# Patient Record
Sex: MALE | Race: WHITE | NOT HISPANIC OR LATINO | Employment: FULL TIME | URBAN - METROPOLITAN AREA
[De-identification: names, ages, dates, MRNs, and addresses within clinical notes are randomized per-mention and may not be internally consistent; named-entity substitution may affect disease eponyms.]

---

## 2022-07-11 PROBLEM — Z86.0100 HISTORY OF COLON POLYPS: Status: ACTIVE | Noted: 2022-07-11

## 2022-12-30 PROBLEM — E66.811 CLASS 1 OBESITY DUE TO EXCESS CALORIES WITH SERIOUS COMORBIDITY AND BODY MASS INDEX (BMI) OF 34.0 TO 34.9 IN ADULT: Status: ACTIVE | Noted: 2018-11-14

## 2024-03-05 ENCOUNTER — TELEPHONE (OUTPATIENT)
Dept: FAMILY MEDICINE CLINIC | Facility: CLINIC | Age: 69
End: 2024-03-05

## 2024-03-05 ENCOUNTER — OFFICE VISIT (OUTPATIENT)
Dept: FAMILY MEDICINE CLINIC | Facility: CLINIC | Age: 69
End: 2024-03-05
Payer: MEDICARE

## 2024-03-05 VITALS
OXYGEN SATURATION: 98 % | SYSTOLIC BLOOD PRESSURE: 154 MMHG | TEMPERATURE: 97.8 F | HEART RATE: 61 BPM | WEIGHT: 249.8 LBS | HEIGHT: 73 IN | DIASTOLIC BLOOD PRESSURE: 96 MMHG | BODY MASS INDEX: 33.11 KG/M2 | RESPIRATION RATE: 18 BRPM

## 2024-03-05 DIAGNOSIS — E11.65 TYPE 2 DIABETES MELLITUS WITH HYPERGLYCEMIA, WITHOUT LONG-TERM CURRENT USE OF INSULIN: ICD-10-CM

## 2024-03-05 DIAGNOSIS — M25.511 CHRONIC RIGHT SHOULDER PAIN: ICD-10-CM

## 2024-03-05 DIAGNOSIS — G89.29 CHRONIC RIGHT SHOULDER PAIN: ICD-10-CM

## 2024-03-05 DIAGNOSIS — I10 PRIMARY HYPERTENSION: ICD-10-CM

## 2024-03-05 DIAGNOSIS — E11.65 TYPE 2 DIABETES MELLITUS WITH HYPERGLYCEMIA, WITHOUT LONG-TERM CURRENT USE OF INSULIN: Primary | ICD-10-CM

## 2024-03-05 LAB
EXPIRATION DATE: ABNORMAL
HBA1C MFR BLD: 10.2 % (ref 4.5–5.7)
Lab: ABNORMAL

## 2024-03-05 RX ORDER — MELOXICAM 7.5 MG/1
7.5 TABLET ORAL DAILY
COMMUNITY

## 2024-03-05 RX ORDER — OLMESARTAN MEDOXOMIL 5 MG/1
5 TABLET ORAL DAILY
Qty: 30 TABLET | Refills: 2 | Status: SHIPPED | OUTPATIENT
Start: 2024-03-05

## 2024-03-05 NOTE — PROGRESS NOTES
"Subjective   Wally Howard Jr. is a 68 y.o. male.     History of Present Illness   Pt presents for follow up   Last office visit was in 2022   Pt has hx of uncontrolled DM that he tries to regulate with diet, weight management and lifestyle   Saw Dr. Blunt 12/22 and was started back on medication with metformin and Mounjaro. No follow up since that time. Took metformin for awhile and notes he tolerated fine. Mounjaro was around 400 dollars a month. Notes his current insurance plan does not have prescription coverage. Plans to change this on next open enrollment   Has been seeing weight loss clinic and prescribed semaglutide. Has taken for about 6 weeks   Had labs prior to starting weight loss medication and A1c was over 11%. Pt notes he has finally told his wife about his diabetes and knows it is time to get things under better control.     Vit D, calcium and magnesium supplement   Also takes CoQ10    R shoulder pain since fall at work about 5 years ago   No follow up after injury   Pain worse at night when sleeping   Wanting to see ortho and PT     BP has consistently been elevated in office. Has been treated with benicar in the past for HTN   Denies any cardiac symptoms     The following portions of the patient's history were reviewed and updated as appropriate: allergies, current medications, past family history, past medical history, past social history, past surgical history, and problem list.    Review of Systems  As noted per HPI     Objective   Blood pressure 154/96, pulse 61, temperature 97.8 °F (36.6 °C), resp. rate 18, height 185.4 cm (73\"), weight 113 kg (249 lb 12.8 oz), SpO2 98%.     Physical Exam  Vitals and nursing note reviewed.   Constitutional:       Appearance: Normal appearance.   Cardiovascular:      Rate and Rhythm: Normal rate and regular rhythm.   Pulmonary:      Effort: Pulmonary effort is normal.      Breath sounds: Normal breath sounds.   Musculoskeletal:      Right shoulder: " Tenderness present. Normal range of motion. Normal strength.   Skin:     General: Skin is warm and dry.   Neurological:      Mental Status: He is alert and oriented to person, place, and time.   Psychiatric:         Mood and Affect: Mood normal.         Behavior: Behavior normal.         Assessment & Plan   Diagnoses and all orders for this visit:    1. Type 2 diabetes mellitus with hyperglycemia, without long-term current use of insulin (Primary)  -     POC Glycosylated Hemoglobin (Hb A1C)  -     metFORMIN (Glucophage) 500 MG tablet; Take 1 tablet by mouth 2 (Two) Times a Day With Meals.  Dispense: 60 tablet; Refill: 2  -     Tirzepatide (MOUNJARO) 5 MG/0.5ML solution pen-injector pen; Inject 0.5 mL under the skin into the appropriate area as directed 1 (One) Time Per Week.  Dispense: 2 mL; Refill: 2    2. Primary hypertension  -     olmesartan (Benicar) 5 MG tablet; Take 1 tablet by mouth Daily.  Dispense: 30 tablet; Refill: 2    3. Chronic right shoulder pain  -     Ambulatory Referral to Orthopedic Surgery  -     Ambulatory Referral to Physical Therapy Evaluate and treat      A1c uncontrolled at 10.2% , but has improved since starting injections at weight loss clinic. Will compare price of Mounjaro prescription at pharmacy vs continued injections at weight loss clinic. Start back on metformin and benicar as directed.   Pt will follow up in 1 month for MWV and fasting labs   Referral to ortho and PT for shoulder issues

## 2024-03-05 NOTE — TELEPHONE ENCOUNTER
Provider: KATHRYN BOYCE    Caller: JEFF NATHAN    Relationship to Patient: SELF    Pharmacy: [facility]    Phone Number: [phone]     Reason for Call: THE PATIENT STATED THAT HIS PHARMACY HAS METFORMIN BUT NO THE MOUNJARO.

## 2024-03-06 NOTE — TELEPHONE ENCOUNTER
Confirm with pharmacy if they did not receive Mounjaro Rx, they do not have it in stock or if patient not able to  due to PA needed

## 2024-03-06 NOTE — TELEPHONE ENCOUNTER
[facility] stated that they are out of stock and can't run it thorugh due to being out of stock so no idea if PA will be needed

## 2024-03-07 DIAGNOSIS — E11.65 TYPE 2 DIABETES MELLITUS WITH HYPERGLYCEMIA, WITHOUT LONG-TERM CURRENT USE OF INSULIN: ICD-10-CM

## 2024-03-07 NOTE — TELEPHONE ENCOUNTER
"  Caller: Wally Howard Jr \"Lewis\"    Relationship: Self    Best call back number: [phone]     Requested Prescriptions:   Requested Prescriptions     Pending Prescriptions Disp Refills    Tirzepatide (MOUNJARO) 5 MG/0.5ML solution pen-injector pen 2 mL 2     Sig: Inject 0.5 mL under the skin into the appropriate area as directed 1 (One) Time Per Week.        Pharmacy where request should be sent: [facility] [address] [place] [address]. [place] - [phone]  - [phone] FX     Last office visit with prescribing clinician: 3/5/2024   Last telemedicine visit with prescribing clinician: Visit date not found   Next office visit with prescribing clinician: 4/2/2024     Additional details provided by patient: [facility] OUT OF MEDICATION, CANNOT GET.  PLEASE RESEND TO [facility].  PLEASE CALL PATIENT BACK.      Does the patient have less than a 3 day supply:  [x] Yes  [] No    Would you like a call back once the refill request has been completed: [x] Yes [] No    If the office needs to give you a call back, can they leave a voicemail: [x] Yes [] No    Rosy Langston   03/07/24 09:37 EST     "

## 2024-03-07 NOTE — TELEPHONE ENCOUNTER
Okay, did the pharmacy say when they expect to have next shipment? If this is only a few day concern, not worth sending the Rx to a different pharmacy

## 2024-03-08 NOTE — TELEPHONE ENCOUNTER
Pharmacy doesn't have any idea when they will receive another shipment since it's nationwide backorder

## 2024-03-11 NOTE — TELEPHONE ENCOUNTER
Pt stated he had this taken care of and insurance covered most of the cost-nothing else to do on this message

## 2024-03-11 NOTE — TELEPHONE ENCOUNTER
Okay please let patient know I resent to [facility] as requested . Can see if ozempic is in stock if he would like if they do not have, but encourage checking with the pharmacy first to see if they have

## 2024-03-14 ENCOUNTER — PATIENT OUTREACH (OUTPATIENT)
Dept: CASE MANAGEMENT | Facility: OTHER | Age: 69
End: 2024-03-14
Payer: MEDICARE

## 2024-03-14 NOTE — OUTREACH NOTE
"AMBULATORY CASE MANAGEMENT NOTE    Name and Relationship of Patient/Support Person: Lee Cook, Wally GARCIA \"Lewis\" - Self    Patient Outreach    Proactive outreach to [facility] pt identified for ACM services and support. Briefly discussed HRCM program and pt politely declined at this time. He states he seems to be managing things better at this time. He will call ACM if anything changes.     Krystle MALDONADO  Ambulatory Case Management    3/14/2024, 15:58 EDT  "

## 2024-03-20 ENCOUNTER — TELEPHONE (OUTPATIENT)
Dept: FAMILY MEDICINE CLINIC | Facility: CLINIC | Age: 69
End: 2024-03-20
Payer: MEDICARE

## 2024-03-20 DIAGNOSIS — I10 PRIMARY HYPERTENSION: Primary | ICD-10-CM

## 2024-03-20 RX ORDER — OLMESARTAN MEDOXOMIL 20 MG/1
20 TABLET ORAL DAILY
Qty: 30 TABLET | Refills: 0 | Status: SHIPPED | OUTPATIENT
Start: 2024-03-20

## 2024-03-20 NOTE — TELEPHONE ENCOUNTER
See message. I sent in the 20 mg for next fill since no 10 mg available. Double up on his 5 mg for now

## 2024-03-20 NOTE — TELEPHONE ENCOUNTER
"Caller: Wally Howard Jr \"Lewis\"    Relationship: Self    Best call back number: [phone]     Which medication are you concerned about: OLMESARTAN (BENICAR) 5MG     Who prescribed you this medication: KATHRYN BOYCE    When did you start taking this medication: ABOUT 2 WEEKS OR SO AGO    What are your concerns: PATIENT STATED THAT IT HAS BEEN ABOUT 2.5 WEEKS AND HIS BLOOD PRESSURE IS STILL HOVERING AROUND -[address]    TODAY HIS BLOOD PRESSURE /106    SHOULD ANY ADJUSTMENTS BE MADE?    PLEASE ADVISE PATIENT  "

## 2024-04-20 DIAGNOSIS — I10 PRIMARY HYPERTENSION: ICD-10-CM

## 2024-04-22 RX ORDER — OLMESARTAN MEDOXOMIL 20 MG/1
20 TABLET ORAL DAILY
Qty: 90 TABLET | Refills: 0 | Status: SHIPPED | OUTPATIENT
Start: 2024-04-22

## 2024-06-02 DIAGNOSIS — E11.65 TYPE 2 DIABETES MELLITUS WITH HYPERGLYCEMIA, WITHOUT LONG-TERM CURRENT USE OF INSULIN: ICD-10-CM

## 2024-06-06 DIAGNOSIS — I10 PRIMARY HYPERTENSION: ICD-10-CM

## 2024-06-06 RX ORDER — OLMESARTAN MEDOXOMIL 5 MG/1
5 TABLET ORAL DAILY
Qty: 30 TABLET | Refills: 2 | OUTPATIENT
Start: 2024-06-06

## 2024-06-27 ENCOUNTER — OFFICE VISIT (OUTPATIENT)
Dept: FAMILY MEDICINE CLINIC | Facility: CLINIC | Age: 69
End: 2024-06-27
Payer: MEDICARE

## 2024-06-27 VITALS
WEIGHT: 226 LBS | DIASTOLIC BLOOD PRESSURE: 70 MMHG | TEMPERATURE: 97.7 F | SYSTOLIC BLOOD PRESSURE: 136 MMHG | HEIGHT: 73 IN | OXYGEN SATURATION: 97 % | HEART RATE: 71 BPM | BODY MASS INDEX: 29.95 KG/M2

## 2024-06-27 DIAGNOSIS — E11.65 TYPE 2 DIABETES MELLITUS WITH HYPERGLYCEMIA, WITHOUT LONG-TERM CURRENT USE OF INSULIN: ICD-10-CM

## 2024-06-27 DIAGNOSIS — I10 PRIMARY HYPERTENSION: ICD-10-CM

## 2024-06-27 DIAGNOSIS — Z12.5 SCREENING FOR MALIGNANT NEOPLASM OF PROSTATE: ICD-10-CM

## 2024-06-27 DIAGNOSIS — E55.9 VITAMIN D INSUFFICIENCY: ICD-10-CM

## 2024-06-27 DIAGNOSIS — Z00.00 MEDICARE ANNUAL WELLNESS VISIT, SUBSEQUENT: Primary | ICD-10-CM

## 2024-06-27 DIAGNOSIS — E11.9 TYPE 2 DIABETES MELLITUS WITHOUT COMPLICATION, WITHOUT LONG-TERM CURRENT USE OF INSULIN: ICD-10-CM

## 2024-06-27 DIAGNOSIS — Z51.81 ENCOUNTER FOR MEDICATION MONITORING: ICD-10-CM

## 2024-06-27 LAB
EXPIRATION DATE: NORMAL
EXPIRATION DATE: NORMAL
HBA1C MFR BLD: 5.6 % (ref 4.5–5.7)
Lab: NORMAL
Lab: NORMAL
POC CREATININE URINE: 100
POC MICROALBUMIN URINE: 10

## 2024-06-27 PROCEDURE — 3078F DIAST BP <80 MM HG: CPT | Performed by: PHYSICIAN ASSISTANT

## 2024-06-27 PROCEDURE — G0439 PPPS, SUBSEQ VISIT: HCPCS | Performed by: PHYSICIAN ASSISTANT

## 2024-06-27 PROCEDURE — 3044F HG A1C LEVEL LT 7.0%: CPT | Performed by: PHYSICIAN ASSISTANT

## 2024-06-27 PROCEDURE — 3075F SYST BP GE 130 - 139MM HG: CPT | Performed by: PHYSICIAN ASSISTANT

## 2024-06-27 PROCEDURE — 83036 HEMOGLOBIN GLYCOSYLATED A1C: CPT | Performed by: PHYSICIAN ASSISTANT

## 2024-06-27 PROCEDURE — 1126F AMNT PAIN NOTED NONE PRSNT: CPT | Performed by: PHYSICIAN ASSISTANT

## 2024-06-27 PROCEDURE — 82044 UR ALBUMIN SEMIQUANTITATIVE: CPT | Performed by: PHYSICIAN ASSISTANT

## 2024-06-27 RX ORDER — OLMESARTAN MEDOXOMIL 20 MG/1
20 TABLET ORAL DAILY
Qty: 90 TABLET | Refills: 3 | Status: SHIPPED | OUTPATIENT
Start: 2024-06-27

## 2024-06-27 NOTE — PROGRESS NOTES
The ABCs of the Annual Wellness Visit  Subsequent Medicare Wellness Visit    Subjective    Wally Howard Jr is a 69 y.o. male who presents for a Subsequent Medicare Wellness Visit.    F/u for DM   Has been able to get Mounjaro with insurance. Tolerating well. Some constipation. Down about 20 lbs   Increased protein. Decreased sweets and carbs. Eating keto friendly foods   Blood sugar has been in low 100s upper 90s     Exercising regularly     BP has been well controlled with bump up of benicar. Some elevated readings prior to sitting and resting for several minutes       The following portions of the patient's history were reviewed and   updated as appropriate: allergies, current medications, past family history, past medical history, past social history, past surgical history, and problem list.    Compared to one year ago, the patient feels his physical   health is better.    Compared to one year ago, the patient feels his mental   health is better.    Recent Hospitalizations:  He was not admitted to the hospital during the last year.       Current Medical Providers:  Patient Care Team:  Nell Da Silva PA as PCP - General (Physician Assistant)    Outpatient Medications Prior to Visit   Medication Sig Dispense Refill    loratadine (Claritin) 10 MG tablet Take 1 tablet by mouth Daily.      meloxicam (MOBIC) 7.5 MG tablet Take 1 tablet by mouth Daily.      metFORMIN (GLUCOPHAGE) 500 MG tablet TAKE 1 TABLET BY MOUTH TWICE DAILY WITH MEALS 60 tablet 2    olmesartan (BENICAR) 20 MG tablet TAKE 1 TABLET BY MOUTH DAILY 90 tablet 0    Tirzepatide (MOUNJARO) 5 MG/0.5ML solution pen-injector pen Inject 0.5 mL under the skin into the appropriate area as directed 1 (One) Time Per Week. 2 mL 2     No facility-administered medications prior to visit.       No opioid medication identified on active medication list. I have reviewed chart for other potential  high risk medication/s and harmful drug interactions in the  "elderly.        Aspirin is not on active medication list.  Aspirin use is indicated based on review of current medical condition/s. Pros and cons of this therapy have been discussed with this patient. Benefits of this medication outweigh potential harm.  Patient has been instructed to start taking this medication..    Patient Active Problem List   Diagnosis    Kidney stones    Squamous cell carcinoma of skin    Seasonal allergies    Hypogonadism male    Hypertension    Mixed hyperlipidemia    Erectile dysfunction    Class 1 obesity due to excess calories with serious comorbidity and body mass index (BMI) of 34.0 to 34.9 in adult    Type 2 diabetes mellitus without complication, without long-term current use of insulin    History of colon polyps     Advance Care Planning   Advance Care Planning     Advance Directive is not on file.  ACP discussion was held with the patient during this visit. Patient does not have an advance directive, information provided.     Objective    Vitals:    24 1517   BP: 136/70   Pulse: 71   Temp: 97.7 °F (36.5 °C)   SpO2: 97%   Weight: 103 kg (226 lb)   Height: 185.4 cm (73\")   PainSc: 0-No pain     Estimated body mass index is 29.82 kg/m² as calculated from the following:    Height as of this encounter: 185.4 cm (73\").    Weight as of this encounter: 103 kg (226 lb).           Does the patient have evidence of cognitive impairment?   No    Lab Results   Component Value Date    HGBA1C 5.6 2024          HEALTH RISK ASSESSMENT    Smoking Status:  Social History     Tobacco Use   Smoking Status Never    Passive exposure: Never   Smokeless Tobacco Never     Alcohol Consumption:  Social History     Substance and Sexual Activity   Alcohol Use Not Currently    Comment: Occasional glass of wine     Fall Risk Screen:    LUZ MARIA Fall Risk Assessment was completed, and patient is at LOW risk for falls.Assessment completed on:2024    Depression Screenin/27/2024     3:24 PM "   PHQ-2/PHQ-9 Depression Screening   Little Interest or Pleasure in Doing Things 0-->not at all   Feeling Down, Depressed or Hopeless 0-->not at all   PHQ-9: Brief Depression Severity Measure Score 0       Health Habits and Functional and Cognitive Screenin/27/2024     3:23 PM   Functional & Cognitive Status   Do you have difficulty preparing food and eating? No   Do you have difficulty bathing yourself, getting dressed or grooming yourself? No   Do you have difficulty using the toilet? No   Do you have difficulty moving around from place to place? No   Do you have trouble with steps or getting out of a bed or a chair? No   Current Diet Low Fat Diet   Dental Exam Up to date   Eye Exam Up to date   Exercise (times per week) 3 times per week   Current Exercises Include Walking;Treadmill;Light Weights   Do you need help using the phone?  No   Are you deaf or do you have serious difficulty hearing?  No   Do you need help to go to places out of walking distance? No   Do you need help shopping? No   Do you need help preparing meals?  No   Do you need help with housework?  No   Do you need help with laundry? No   Do you need help taking your medications? No   Do you need help managing money? No   Do you ever drive or ride in a car without wearing a seat belt? No   Have you felt unusual stress, anger or loneliness in the last month? No   Who do you live with? Spouse   If you need help, do you have trouble finding someone available to you? No   Have you been bothered in the last four weeks by sexual problems? No   Do you have difficulty concentrating, remembering or making decisions? No       Age-appropriate Screening Schedule:  Refer to the list below for future screening recommendations based on patient's age, sex and/or medical conditions. Orders for these recommended tests are listed in the plan section. The patient has been provided with a written plan.    Health Maintenance   Topic Date Due    RSV Vaccine -  Adults (1 - 1-dose 60+ series) Never done    DIABETIC FOOT EXAM  Never done    DIABETIC EYE EXAM  04/01/2023    LIPID PANEL  09/14/2023    COVID-19 Vaccine (7 - 2023-24 season) 09/16/2024 (Originally 2/9/2024)    INFLUENZA VACCINE  08/01/2024    HEMOGLOBIN A1C  12/27/2024    BMI FOLLOWUP  03/05/2025    ANNUAL WELLNESS VISIT  06/27/2025    URINE MICROALBUMIN  06/27/2025    TDAP/TD VACCINES (2 - Td or Tdap) 09/20/2026    COLORECTAL CANCER SCREENING  07/07/2027    Pneumococcal Vaccine 65+  Completed    HEPATITIS C SCREENING  Addressed    ZOSTER VACCINE  Addressed                  CMS Preventative Services Quick Reference  Risk Factors Identified During Encounter:    None Identified    The above risks/problems have been discussed with the patient.  Pertinent information has been shared with the patient in the After Visit Summary.    Diagnoses and all orders for this visit:    1. Medicare annual wellness visit, subsequent (Primary)  -     POCT microalbumin  -     CBC w AUTO Differential  -     Comprehensive metabolic panel  -     TSH  -     T4, free  -     Vitamin D 25 hydroxy  -     PSA SCREENING  -     Magnesium  -     POC Glycosylated Hemoglobin (Hb A1C)  -     Lipid Panel    2. Type 2 diabetes mellitus without complication, without long-term current use of insulin  -     POCT microalbumin  -     CBC w AUTO Differential  -     Comprehensive metabolic panel  -     TSH  -     T4, free  -     POC Glycosylated Hemoglobin (Hb A1C)  -     Lipid Panel    3. Primary hypertension  -     CBC w AUTO Differential  -     Comprehensive metabolic panel    4. Encounter for medication monitoring  -     Magnesium    5. Screening for malignant neoplasm of prostate  -     PSA SCREENING    6. Vitamin D insufficiency  -     Vitamin D 25 hydroxy        Follow Up:   Next Medicare Wellness visit to be scheduled in 1 year.      An After Visit Summary and PPPS were made available to the patient.    Contracture of left hand

## 2024-06-28 LAB
25(OH)D3+25(OH)D2 SERPL-MCNC: 53.2 NG/ML (ref 30–100)
ALBUMIN SERPL-MCNC: 4.4 G/DL (ref 3.9–4.9)
ALP SERPL-CCNC: 96 IU/L (ref 44–121)
ALT SERPL-CCNC: 11 IU/L (ref 0–44)
AST SERPL-CCNC: 15 IU/L (ref 0–40)
BASOPHILS # BLD AUTO: 0.1 X10E3/UL (ref 0–0.2)
BASOPHILS NFR BLD AUTO: 1 %
BILIRUB SERPL-MCNC: 0.7 MG/DL (ref 0–1.2)
BUN SERPL-MCNC: 24 MG/DL (ref 8–27)
BUN/CREAT SERPL: 22 (ref 10–24)
CALCIUM SERPL-MCNC: 9.9 MG/DL (ref 8.6–10.2)
CHLORIDE SERPL-SCNC: 99 MMOL/L (ref 96–106)
CHOLEST SERPL-MCNC: 170 MG/DL (ref 100–199)
CO2 SERPL-SCNC: 22 MMOL/L (ref 20–29)
CREAT SERPL-MCNC: 1.08 MG/DL (ref 0.76–1.27)
EGFRCR SERPLBLD CKD-EPI 2021: 74 ML/MIN/1.73
EOSINOPHIL # BLD AUTO: 0.1 X10E3/UL (ref 0–0.4)
EOSINOPHIL NFR BLD AUTO: 1 %
ERYTHROCYTE [DISTWIDTH] IN BLOOD BY AUTOMATED COUNT: 12.6 % (ref 11.6–15.4)
GLOBULIN SER CALC-MCNC: 2.8 G/DL (ref 1.5–4.5)
GLUCOSE SERPL-MCNC: 82 MG/DL (ref 70–99)
HCT VFR BLD AUTO: 48 % (ref 37.5–51)
HDLC SERPL-MCNC: 42 MG/DL
HGB BLD-MCNC: 15.9 G/DL (ref 13–17.7)
IMM GRANULOCYTES # BLD AUTO: 0 X10E3/UL (ref 0–0.1)
IMM GRANULOCYTES NFR BLD AUTO: 0 %
LDLC SERPL CALC-MCNC: 112 MG/DL (ref 0–99)
LYMPHOCYTES # BLD AUTO: 3.1 X10E3/UL (ref 0.7–3.1)
LYMPHOCYTES NFR BLD AUTO: 33 %
MAGNESIUM SERPL-MCNC: 1.7 MG/DL (ref 1.6–2.3)
MCH RBC QN AUTO: 29.6 PG (ref 26.6–33)
MCHC RBC AUTO-ENTMCNC: 33.1 G/DL (ref 31.5–35.7)
MCV RBC AUTO: 89 FL (ref 79–97)
MONOCYTES # BLD AUTO: 0.6 X10E3/UL (ref 0.1–0.9)
MONOCYTES NFR BLD AUTO: 6 %
NEUTROPHILS # BLD AUTO: 5.4 X10E3/UL (ref 1.4–7)
NEUTROPHILS NFR BLD AUTO: 59 %
PLATELET # BLD AUTO: 253 X10E3/UL (ref 150–450)
POTASSIUM SERPL-SCNC: 4.7 MMOL/L (ref 3.5–5.2)
PROT SERPL-MCNC: 7.2 G/DL (ref 6–8.5)
PSA SERPL-MCNC: 0.6 NG/ML (ref 0–4)
RBC # BLD AUTO: 5.38 X10E6/UL (ref 4.14–5.8)
SODIUM SERPL-SCNC: 136 MMOL/L (ref 134–144)
T4 FREE SERPL-MCNC: 1.14 NG/DL (ref 0.82–1.77)
TRIGL SERPL-MCNC: 88 MG/DL (ref 0–149)
TSH SERPL DL<=0.005 MIU/L-ACNC: 4.54 UIU/ML (ref 0.45–4.5)
VLDLC SERPL CALC-MCNC: 16 MG/DL (ref 5–40)
WBC # BLD AUTO: 9.3 X10E3/UL (ref 3.4–10.8)

## 2024-07-11 ENCOUNTER — TELEPHONE (OUTPATIENT)
Dept: FAMILY MEDICINE CLINIC | Facility: CLINIC | Age: 69
End: 2024-07-11
Payer: MEDICARE

## 2024-07-11 DIAGNOSIS — E03.9 ACQUIRED HYPOTHYROIDISM: Primary | ICD-10-CM

## 2024-07-11 RX ORDER — LEVOTHYROXINE SODIUM 0.03 MG/1
25 TABLET ORAL
Qty: 30 TABLET | Refills: 1 | Status: SHIPPED | OUTPATIENT
Start: 2024-07-11

## 2024-07-11 NOTE — TELEPHONE ENCOUNTER
"  Caller: Wally Howard Jr \"Lewis\"    Relationship: Self    Best call back number: [phone]     What is the best time to reach you: ANY    Who are you requesting to speak with (clinical staff, provider,  specific staff member): KATHRYN BOYCE OR HER NURSE    What was the call regarding: THE PATIENT WAS SPEAKING WITH SOMEONE THAT SAID THEY WOULD TALK TO KATHRYN ABOUT HIS ISSUE WITH HIS THYROID TO SEE WHAT FURTHER STEPS WOULD BE RECOMMENDED. THEY ALSO WERE GOING TO SEE ABOUT A CARDIAC TEST. CAN HE PLEASE GET A CALL BACK ON THIS TO LET HIM KNOW?     Is it okay if the provider responds through MyChart: NO  "

## 2024-07-23 DIAGNOSIS — Z13.6 ENCOUNTER FOR SCREENING FOR CORONARY ARTERY DISEASE: Primary | ICD-10-CM

## 2024-08-06 ENCOUNTER — TELEPHONE (OUTPATIENT)
Dept: FAMILY MEDICINE CLINIC | Facility: CLINIC | Age: 69
End: 2024-08-06
Payer: MEDICARE

## 2024-08-06 DIAGNOSIS — E11.9 TYPE 2 DIABETES MELLITUS WITHOUT COMPLICATION, WITHOUT LONG-TERM CURRENT USE OF INSULIN: ICD-10-CM

## 2024-08-06 NOTE — TELEPHONE ENCOUNTER
"  Caller: Wally Howard Jr \"Lewis\"    Relationship: Self    Best call back number: [phone]    What is the best time to reach you: ANYTIME    Who are you requesting to speak with (clinical staff, provider,  specific staff member): CLINICAL STAFF    Do you know the name of the person who called: PATIENT    What was the call regarding: REQUESTING AN INCREASE IN Tirzepatide (MOUNJARO) 5 MG/0.5ML solution pen-injector pen TO 7.5    Is it okay if the provider responds through MyChart:     [facility] [address] [place] [address] RD. [place] - [phone]  - [phone] FX   "

## 2024-08-26 ENCOUNTER — TELEPHONE (OUTPATIENT)
Dept: FAMILY MEDICINE CLINIC | Facility: CLINIC | Age: 69
End: 2024-08-26
Payer: MEDICARE

## 2024-08-26 NOTE — TELEPHONE ENCOUNTER
"  Caller: Wally Howard Jr \"Lewis\"    Relationship: Self    Best call back number: [phone]     What is the best time to reach you: ANY    Who are you requesting to speak with (clinical staff, provider,  specific staff member): KATHRYN BOYCE OR HER NURSE    What was the call regarding: THE PATIENT IS GOING TO COME IN FOR HIS ACTIVE LAB ORDERS. HE WAS WANTING TO KNOW IF THERE IS ANY WAY THAT HE CAN GET A COVID TEST WITH THAT TOO. HE CAME BACK FROM VACATION AND HAS A CONGESTION, DRAINAGE, AND A TICKLE COUGH. HE SAID HE TOOK A HOME TEST AND IT WAS NEGATIVE BUT WANTS TO DOUBLE CHECK. IS THIS SOMETHING THAT CAN BE DONE OR WOULD HE NEED AN APPOINTMENT? PLEASE CALL TO LET HIM KNOW.     Is it okay if the provider responds through MyChart: NO  "

## 2024-09-12 ENCOUNTER — HOSPITAL ENCOUNTER (OUTPATIENT)
Facility: HOSPITAL | Age: 69
Discharge: HOME | End: 2024-09-12
Admitting: PHYSICIAN ASSISTANT

## 2024-09-12 DIAGNOSIS — Z13.6 ENCOUNTER FOR SCREENING FOR CORONARY ARTERY DISEASE: ICD-10-CM

## 2024-09-12 PROCEDURE — 75571 CT HRT W/O DYE W/CA TEST: CPT

## 2024-09-13 ENCOUNTER — LAB (OUTPATIENT)
Dept: FAMILY MEDICINE CLINIC | Facility: CLINIC | Age: 69
End: 2024-09-13
Payer: MEDICARE

## 2024-09-13 DIAGNOSIS — E03.9 ACQUIRED HYPOTHYROIDISM: ICD-10-CM

## 2024-09-15 DIAGNOSIS — E03.9 ACQUIRED HYPOTHYROIDISM: ICD-10-CM

## 2024-09-15 RX ORDER — LEVOTHYROXINE SODIUM 25 UG/1
25 TABLET ORAL
Qty: 90 TABLET | Refills: 1 | Status: SHIPPED | OUTPATIENT
Start: 2024-09-15

## 2024-09-15 RX ORDER — LEVOTHYROXINE SODIUM 25 UG/1
25 TABLET ORAL
Qty: 30 TABLET | Refills: 1 | OUTPATIENT
Start: 2024-09-15

## 2024-09-16 DIAGNOSIS — E78.2 MIXED HYPERLIPIDEMIA: Primary | ICD-10-CM

## 2024-09-16 RX ORDER — EZETIMIBE 10 MG/1
10 TABLET ORAL DAILY
Qty: 90 TABLET | Refills: 1 | Status: SHIPPED | OUTPATIENT
Start: 2024-09-16

## 2025-02-17 ENCOUNTER — TELEPHONE (OUTPATIENT)
Dept: FAMILY MEDICINE CLINIC | Facility: CLINIC | Age: 70
End: 2025-02-17
Payer: MEDICARE

## 2025-02-17 NOTE — TELEPHONE ENCOUNTER
PA for mounjaro  Key: [id number]  Approved today by [employer] NCPDP 2017  Your request has been approved  Authorization Expiration Date: 2/17/2026  PT informed

## 2025-03-11 DIAGNOSIS — E03.9 ACQUIRED HYPOTHYROIDISM: ICD-10-CM

## 2025-03-12 RX ORDER — LEVOTHYROXINE SODIUM 25 UG/1
25 TABLET ORAL
Qty: 90 TABLET | Refills: 0 | Status: SHIPPED | OUTPATIENT
Start: 2025-03-12

## 2025-03-20 DIAGNOSIS — E78.2 MIXED HYPERLIPIDEMIA: ICD-10-CM

## 2025-03-20 RX ORDER — EZETIMIBE 10 MG/1
10 TABLET ORAL DAILY
Qty: 90 TABLET | Refills: 1 | Status: SHIPPED | OUTPATIENT
Start: 2025-03-20

## 2025-05-21 ENCOUNTER — OFFICE VISIT (OUTPATIENT)
Dept: FAMILY MEDICINE CLINIC | Facility: CLINIC | Age: 70
End: 2025-05-21
Payer: MEDICARE

## 2025-05-21 VITALS
DIASTOLIC BLOOD PRESSURE: 84 MMHG | BODY MASS INDEX: 33.13 KG/M2 | SYSTOLIC BLOOD PRESSURE: 158 MMHG | HEIGHT: 73 IN | OXYGEN SATURATION: 97 % | TEMPERATURE: 97.5 F | HEART RATE: 58 BPM | WEIGHT: 250 LBS

## 2025-05-21 DIAGNOSIS — E29.1 HYPOGONADISM IN MALE: ICD-10-CM

## 2025-05-21 DIAGNOSIS — I10 PRIMARY HYPERTENSION: ICD-10-CM

## 2025-05-21 DIAGNOSIS — E11.9 TYPE 2 DIABETES MELLITUS WITHOUT COMPLICATION, WITHOUT LONG-TERM CURRENT USE OF INSULIN: Primary | ICD-10-CM

## 2025-05-21 LAB
EXPIRATION DATE: ABNORMAL
HBA1C MFR BLD: 6.3 % (ref 4.5–5.7)
Lab: ABNORMAL

## 2025-05-21 RX ORDER — MELOXICAM 15 MG/1
15 TABLET ORAL DAILY PRN
COMMUNITY
Start: 2025-05-12

## 2025-05-21 RX ORDER — PREDNISOLONE ACETATE 10 MG/ML
SUSPENSION/ DROPS OPHTHALMIC
COMMUNITY
Start: 2025-04-28

## 2025-05-21 RX ORDER — KETOROLAC TROMETHAMINE 5 MG/ML
SOLUTION OPHTHALMIC
COMMUNITY
Start: 2025-04-28

## 2025-05-21 RX ORDER — CETIRIZINE HYDROCHLORIDE 10 MG/1
TABLET, CHEWABLE ORAL DAILY
COMMUNITY

## 2025-05-21 RX ORDER — OLMESARTAN MEDOXOMIL 40 MG/1
40 TABLET ORAL DAILY
Qty: 90 TABLET | Refills: 1 | Status: SHIPPED | OUTPATIENT
Start: 2025-05-21

## 2025-05-21 RX ORDER — ASPIRIN 81 MG/1
81 TABLET, CHEWABLE ORAL DAILY
COMMUNITY

## 2025-05-21 RX ORDER — MOXIFLOXACIN 5 MG/ML
SOLUTION/ DROPS OPHTHALMIC
COMMUNITY
Start: 2025-03-20

## 2025-05-21 NOTE — PROGRESS NOTES
Subjective   Wally Howard Jr is a 70 y.o. male.     History of Present Illness   History of Present Illness  The patient presents for evaluation of diabetes, weight gain, cholesterol management, hypertension, and knee arthritis.    He has been unable to attend his scheduled appointments due to unforeseen circumstances. He reports a general sense of well-being but has experienced weight gain over the winter months. Despite this, he has been diligent in monitoring his blood glucose levels, which typically range between 85 and 100 upon waking. He acknowledges that his A1c level is slightly elevated compared to previous readings. He has discontinued his low-carbohydrate diet and expresses concern about his weight gain, even though he maintains a healthy diet and avoids high-calorie snacks. He believes that the effectiveness of Mounjaro in controlling his appetite has diminished over time and is considering increasing the dosage to 10 mg. He has been off Mounjaro for approximately 6 to 7 weeks due to recent cataract surgery, with the first procedure on 04/15/2025 and the second on 05/06/2025. He has observed that his blood glucose levels were around 115 and 120 on a few mornings when he was not taking Mounjaro.    He is currently on a regimen of levothyroxine and Zetia following a cardiac event. He reports no issues with these medications but is curious about their potential impact on his weight. He is also questioning the necessity of continuing these medications.    He has noticed elevated blood pressure readings during his last three doctor visits, including those for his eye surgeries and at the sports medicine clinic. His blood pressure was recorded as 158 today, with previous readings of 150 and 170. He is considering adjusting his olmesartan dosage and has a 90-day supply of the medication at home. He also has a 30-day supply of the 10 mg dose and is contemplating using two of these tablets daily.    He  "underwent an x-ray examination a few years ago, which revealed arthritis in his knee. He sought treatment at a sports medicine clinic where he was prescribed meloxicam. He is interested in having his estrogen levels checked during his next blood work appointment.    He has a colonoscopy scheduled for 07/09/2025, even though it has only been 3 years since his last one. He is also interested in having his testosterone levels checked.    PAST SURGICAL HISTORY:  - Cataract surgery on 04/15/2025 and 05/06/2025  - Cardiac procedure (date not specified)    FAMILY HISTORY  His brother had a hip replacement and knee issues.       The following portions of the patient's history were reviewed and updated as appropriate: allergies, current medications, past family history, past medical history, past social history, past surgical history, and problem list.    Review of Systems  As noted per HPI     Objective   Blood pressure 158/84, pulse 58, temperature 97.5 °F (36.4 °C), height 185.4 cm (73\"), weight 113 kg (250 lb), SpO2 97%. Body mass index is 32.98 kg/m².     Physical Exam  Constitutional:       Appearance: Normal appearance.   Cardiovascular:      Rate and Rhythm: Normal rate and regular rhythm.   Pulmonary:      Effort: Pulmonary effort is normal.      Breath sounds: Normal breath sounds.   Skin:     General: Skin is warm and dry.   Neurological:      Mental Status: He is alert and oriented to person, place, and time.   Psychiatric:         Mood and Affect: Mood normal.         Behavior: Behavior normal.         Results  Labs   - A1c: 6.3   - PSA: 06/2024, 0.6    Imaging   - Cardiac calcium score: 7.3    Assessment & Plan   Assessment & Plan  1. Diabetes Mellitus.  - His A1c level is currently at 6.3, indicating good control of his blood glucose levels despite a temporary discontinuation of Mounjaro due to cataract surgery.  - Blood glucose levels in the morning have been between 85 and 100.  - Discussion included the " temporary discontinuation of Mounjaro and its potential impact on weight and blood glucose management.  - He will continue with the Mounjaro 7.5 mg for a few weeks before transitioning to the 10 mg dosage. He will  the 7.5 mg and the 10 mg will be ready for the next fill.    2. Weight Gain.  - He has experienced weight gain, which may be related to the temporary discontinuation of Mounjaro.  - Physical activity has increased recently, and he continues to follow a low-carb diet.  - Discussion included the potential impact of levothyroxine on weight and the decision to discontinue it temporarily.  - He will continue with the Mounjaro 7.5 mg for a few weeks before transitioning to the 10 mg dosage to help with appetite control and weight management.    3. Cholesterol Management.  - His cholesterol levels are well-managed with Zetia (ezetimibe) 10 mg.  - No issues reported with the current medication.  - Discussion included the benefits of Zetia in regulating cholesterol levels and stabilizing plaque.  - He will continue this medication as it helps regulate cholesterol levels and stabilize plaque, which are life-extending benefits.    4. Hypertension.  - Blood pressure readings have been elevated during recent visits, with readings of 158/xx and 170/xx.  - He monitors his blood pressure at home, noting higher readings initially that decrease after multiple checks.  - Discussion included the potential need to adjust olmesartan dosage and the decision to increase it to 40 mg daily.  - A new prescription for olmesartan 40 mg will be sent to the pharmacy. He will monitor his blood pressure at home and report any significant changes or side effects such as lightheadedness or dizziness.    5. Knee Arthritis.  - He is currently taking meloxicam for knee arthritis.  - No significant improvement reported yet.  - Discussion included the potential impact of estrogen on joint pain and the decision to check hormone levels.  -  He will continue to follow up with the sports medicine clinic to monitor the effectiveness of meloxicam.    6. Health Maintenance.  - His PSA level is within the normal range at 0.6.  - He is scheduled for a colonoscopy on 07/09/2025, even though the previous recommendation was for a follow-up in 5 years from 2022.  - Discussion included the importance of regular screenings and the potential for insurance coverage.  - He will have his testosterone and estrogen levels checked during his next visit.    7. Thyroid Management.  - He will discontinue levothyroxine for a period to assess its impact on his weight and energy levels.  - Previous lab results showed thyroid function within the normal range while on medication.  - Discussion included the potential correlation between levothyroxine and weight gain.  - His thyroid function will be re-evaluated after being off the medication for a while to determine if he needs to resume it.    Follow-up  The patient will follow up in a few months for an annual checkup and fasting visit.     Diagnoses and all orders for this visit:    1. Type 2 diabetes mellitus without complication, without long-term current use of insulin (Primary)  -     POC Albumin/Creatinine Ratio Urine  -     POC Glycosylated Hemoglobin (Hb A1C)  -     Tirzepatide 10 MG/0.5ML solution auto-injector; Inject 10 mg under the skin into the appropriate area as directed 1 (One) Time Per Week.  Dispense: 2 mL; Refill: 3    2. Primary hypertension  -     olmesartan (Benicar) 40 MG tablet; Take 1 tablet by mouth Daily.  Dispense: 90 tablet; Refill: 1    3. Hypogonadism in male  -     Testosterone (Free & Total), LC / MS  -     Estradiol  -     Estrogens, Total               Patient or patient representative verbalized consent for the use of Ambient Listening during the visit with  SADE Gracia for chart documentation. 5/30/2025  16:24 EDT

## 2025-07-11 ENCOUNTER — TELEPHONE (OUTPATIENT)
Dept: FAMILY MEDICINE CLINIC | Facility: CLINIC | Age: 70
End: 2025-07-11

## 2025-08-06 ENCOUNTER — TELEPHONE (OUTPATIENT)
Dept: FAMILY MEDICINE CLINIC | Facility: CLINIC | Age: 70
End: 2025-08-06
Payer: MEDICARE

## 2025-08-06 DIAGNOSIS — Z71.84 TRAVEL ADVICE ENCOUNTER: Primary | ICD-10-CM

## 2025-08-06 DIAGNOSIS — Z11.59 ENCOUNTER FOR SCREENING FOR OTHER VIRAL DISEASES: ICD-10-CM

## 2025-08-08 ENCOUNTER — LAB (OUTPATIENT)
Dept: FAMILY MEDICINE CLINIC | Facility: CLINIC | Age: 70
End: 2025-08-08
Payer: MEDICARE

## 2025-08-08 DIAGNOSIS — Z71.84 TRAVEL ADVICE ENCOUNTER: ICD-10-CM

## 2025-08-08 DIAGNOSIS — Z11.59 ENCOUNTER FOR SCREENING FOR OTHER VIRAL DISEASES: ICD-10-CM

## 2025-08-14 ENCOUNTER — TELEPHONE (OUTPATIENT)
Dept: FAMILY MEDICINE CLINIC | Facility: CLINIC | Age: 70
End: 2025-08-14
Payer: MEDICARE

## 2025-08-15 ENCOUNTER — TELEPHONE (OUTPATIENT)
Dept: FAMILY MEDICINE CLINIC | Facility: CLINIC | Age: 70
End: 2025-08-15
Payer: MEDICARE

## 2025-08-15 LAB
ABO GROUP BLD: NORMAL
ESTRADIOL SERPL-MCNC: 23.2 PG/ML (ref 7.6–42.6)
ESTROGEN SERPL-MCNC: 182 PG/ML (ref 56–213)
HAV IGM SERPL QL IA: NEGATIVE
HBV SURFACE AB SER QL: NON REACTIVE
MEV IGG SER IA-ACNC: >300 AU/ML
MUV IGG SER IA-ACNC: 66.7 AU/ML
RH BLD: NEGATIVE
RUBV IGG SERPL IA-ACNC: 12.3 INDEX
TESTOST FREE SERPL-MCNC: 4.9 PG/ML (ref 6.6–18.1)
TESTOST SERPL-MCNC: 457 NG/DL (ref 264–916)
VZV IGG SER QL IA: REACTIVE